# Patient Record
Sex: FEMALE | Race: ASIAN | URBAN - METROPOLITAN AREA
[De-identification: names, ages, dates, MRNs, and addresses within clinical notes are randomized per-mention and may not be internally consistent; named-entity substitution may affect disease eponyms.]

---

## 2017-09-07 ENCOUNTER — OFFICE VISIT (OUTPATIENT)
Dept: FAMILY MEDICINE | Facility: CLINIC | Age: 22
End: 2017-09-07

## 2017-09-07 VITALS
TEMPERATURE: 98.4 F | DIASTOLIC BLOOD PRESSURE: 60 MMHG | WEIGHT: 115 LBS | SYSTOLIC BLOOD PRESSURE: 90 MMHG | OXYGEN SATURATION: 98 % | BODY MASS INDEX: 21.16 KG/M2 | HEART RATE: 71 BPM | HEIGHT: 62 IN | RESPIRATION RATE: 16 BRPM

## 2017-09-07 DIAGNOSIS — R35.89 POLYURIA: ICD-10-CM

## 2017-09-07 DIAGNOSIS — R42 DIZZINESS: Primary | ICD-10-CM

## 2017-09-07 LAB
ALBUMIN SERPL-MCNC: 4.3 G/DL (ref 3.4–5)
ALP SERPL-CCNC: 59 U/L (ref 40–150)
ALT SERPL W P-5'-P-CCNC: 19 U/L (ref 0–50)
ANION GAP SERPL CALCULATED.3IONS-SCNC: 10 MMOL/L (ref 3–14)
AST SERPL W P-5'-P-CCNC: 11 U/L (ref 0–45)
BILIRUB SERPL-MCNC: 0.4 MG/DL (ref 0.2–1.3)
BUN SERPL-MCNC: 7 MG/DL (ref 7–30)
CALCIUM SERPL-MCNC: 9 MG/DL (ref 8.5–10.1)
CHLORIDE SERPL-SCNC: 104 MMOL/L (ref 94–109)
CO2 SERPL-SCNC: 24 MMOL/L (ref 20–32)
CREAT SERPL-MCNC: 0.54 MG/DL (ref 0.52–1.04)
ERYTHROCYTE [DISTWIDTH] IN BLOOD BY AUTOMATED COUNT: 12.5 % (ref 10–15)
GFR SERPL CREATININE-BSD FRML MDRD: >90 ML/MIN/1.7M2
GLUCOSE SERPL-MCNC: 109 MG/DL (ref 70–99)
HBA1C MFR BLD: 6 % (ref 4.3–6)
HCT VFR BLD AUTO: 37.7 % (ref 35–47)
HGB BLD-MCNC: 13 G/DL (ref 11.7–15.7)
MCH RBC QN AUTO: 31.2 PG (ref 26.5–33)
MCHC RBC AUTO-ENTMCNC: 34.5 G/DL (ref 31.5–36.5)
MCV RBC AUTO: 90 FL (ref 78–100)
PLATELET # BLD AUTO: 311 10E9/L (ref 150–450)
POTASSIUM SERPL-SCNC: 3.6 MMOL/L (ref 3.4–5.3)
PROT SERPL-MCNC: 8.6 G/DL (ref 6.8–8.8)
RBC # BLD AUTO: 4.17 10E12/L (ref 3.8–5.2)
SODIUM SERPL-SCNC: 138 MMOL/L (ref 133–144)
TSH SERPL DL<=0.005 MIU/L-ACNC: 2.1 MU/L (ref 0.4–4)
WBC # BLD AUTO: 10.2 10E9/L (ref 4–11)

## 2017-09-07 ASSESSMENT — PATIENT HEALTH QUESTIONNAIRE - PHQ9: SUM OF ALL RESPONSES TO PHQ QUESTIONS 1-9: 15

## 2017-09-07 ASSESSMENT — PAIN SCALES - GENERAL: PAINLEVEL: SEVERE PAIN (7)

## 2017-09-07 NOTE — MR AVS SNAPSHOT
After Visit Summary   9/7/2017    Tanner Bowling    MRN: [id number]           Patient Information     Date Of Birth          1995        Visit Information        Provider Department      9/7/2017 6:30 PM Clinician, [facility]        Today's Diagnoses     Dizziness    -  1    Polyuria          Care Instructions    Patient Visit Summary    Patient Name: Tanner Bowling  MRN: [id number]    Date of Visit: 9/7/2017    Principle Diagnosis: headache and dizziness       Lab Tests Performed:   - Complete metabolic panel   - Thyroid Stimulating Hormone   - Hemoglobin A1C   - Complete blood count     Follow Up/Results:   PN will call with lab results and further instructions.     Referrals and Instructions:   [facility] will be having ophthomology night on 9/18 where they can look at your eyes.     Physician: Dr. Catrina Galvez           Follow-ups after your visit        Who to contact     Please call your clinic at [phone] to:    Ask questions about your health    Make or cancel appointments    Discuss your medicines    Learn about your test results    Speak to your doctor   If you have compliments or concerns about an experience at your clinic, or if you wish to file a complaint, please contact [facility] Patient Relations at [phone] or email us at [email or web address]         Additional Information About Your Visit        MyChart Information     [email or web address] is an electronic gateway that provides easy, online access to your medical records. With [employer], you can request a clinic appointment, read your test results, renew a prescription or communicate with your care team.     To sign up for [email or web address] visit the website at [email or web address]   You will be asked to enter the access code listed below, as well as some personal information. Please follow the directions to create your username and password.     Your access code is:  "[email or web address]  Expires: 2017  8:18 PM     Your access code will  in 90 days. If you need help or a new code, please contact your [facility] or call [phone] for assistance.        Care EveryWhere ID     This is your Care EveryWhere ID. This could be used by other organizations to access your [place] medical records  [id number]        Your Vitals Were     Pulse Temperature Respirations Height Pulse Oximetry BMI (Body Mass Index)    71 98.4  F (36.9  C) (Oral) 16 5' 2\" (157.5 cm) 98% 21.03 kg/m2       Blood Pressure from Last 3 Encounters:   17 90/60    Weight from Last 3 Encounters:   17 115 lb (52.2 kg)              We Performed the Following     CBC with platelets     Comprehensive metabolic panel     Hemoglobin A1c     TSH with free T4 reflex        Primary Care Provider    None Specified       No primary provider on file.        Equal Access to Services     ALE CEE : Hadii kenny rodaso Rebekah, waaxda luqadaha, qaybta kaalmada adearnolda, leonard mobley . So [facility] [phone].    ATENCIÓN: Si habla español, tiene a de disposición servicios gratuitos de asistencia lingüística. Alina al [phone].    We comply with applicable federal civil rights laws and [place] laws. We do not discriminate on the basis of race, color, national origin, age, disability sex, sexual orientation or gender identity.            Thank you!     Thank you for choosing [facility]  for your care. Our goal is always to provide you with excellent care. Hearing back from our patients is one way we can continue to improve our services. Please take a few minutes to complete the written survey that you may receive in the mail after your visit with us. Thank you!             Your Updated Medication List - Protect others around you: Learn how to safely use, store and throw away your medicines at www.disposemymeds.org.      Notice  As of " 9/7/2017  8:18 PM    You have not been prescribed any medications.

## 2017-09-07 NOTE — Clinical Note
I have completed my note, please review, add tie in statement and close encounter.   Thanks!   Margaret Hudson

## 2017-09-08 NOTE — NURSING NOTE
Patient is 21 year old female who presents to clinic with CC of dizziness accompanied with a headache. She reports of a headache with dizziness for 4 days now and blurry vision. Denies aura, stomach pain or night sweats. Also denies vertigo.     Has not tried any pain relief, but experiences relief by laying down in a darker room and with eyes closed.     Patient also describes a PMHx of depression, PTSD and anxiety. Currently taking Sirtalin 100 mg 1x daily. Denies side effects.     Michelle Mckeon, SN

## 2017-09-08 NOTE — PATIENT INSTRUCTIONS
Patient Visit Summary    Patient Name: Tanner Bowling  MRN: [id number]    Date of Visit: 9/7/2017    Principle Diagnosis: headache and dizziness       Lab Tests Performed:   - Complete metabolic panel   - Thyroid Stimulating Hormone   - Hemoglobin A1C   - Complete blood count     Follow Up/Results:   [facility] will call with lab results and further instructions.     Referrals and Instructions:   [facility] will be having ophthomology night on 9/18 where they can look at your eyes.     Physician: Dr. Catrina Galvez

## 2017-09-08 NOTE — PROGRESS NOTES
"MEDICINE NOTE    SUBJECTIVE:   Tanner is a 21 year old female who presents with a multiple month history of worsening headache and dizziness that has been further exacerbated for the last week. She states that the dizziness, which she describes as a heavy-headed feeling that often causes her to lose her balance, has been constant but increasing in severity for a few months now. The headache, which is focal but in inconsistent locations, has progressively worsened over a similar time frame. She rates the latest severity of the headache as a 7/10 and reports no auras. She reports that in the last week, her vision has blurred more than she's used to (she's nearsighted, but it seems that the distance threshold for unclear vision has decreased), and that her hearing seems worse. Upon review of systems, she noted that she has lost about 20 pounds since her last healthcare visit in January. Significantly, she reports frequent urination, stating that if she drinks a full glass of water, she'll \"have to pee three times in ten minutes\" and that her friends have mentioned that she urinates a lot. Finally, she mentioned that her left arm often feels generally numb when she has her headache.     She reports eating well (three meals per day) and drinking adequate water, as well as exercising regularly.    REVIEW OF SYSTEMS:  Gen: generalized weakness, no fevers, chills  Eyes: <see HPI>, nearsightedness, no diplopia  Ears, Noses, Mouth, Throat: see HPI re: hearing change, no ringing in ears, no epistaxis, occasional nasal discharge, frequent dry mouth  Cardiac: no chest pain, or palpitations  Lungs: some coughing in the middle of the night, no dyspnea or shortness of breath, no sputum or hemoptysis  Neck: some neck stiffness  GI: occasional nausea, normally fairly constipated (1 BM every 2-3 days), no vomiting or diarrhea , abdominal pain related to constipation  : frequent urination (see HPI), no hematuria, or sexual " dysfunction  Musculoskeletal: no joint or muscle pain or swelling  Skin: no concerning lesions  Neuro: See HPI, hand tremor sometimes, no seizure or fainting  Endo: See HPI  Heme/Lymph: no bleeding problems  Psych: reported diagnosed depression and anxiety, has been having roommate troubles which have affected her mood negatively, feels tired frequently but regularly gets 7 hrs sleep per night    No past medical history on file.    No past surgical history on file.    No family history on file.    Social History     Social History     Marital status: Single     Spouse name: N/A     Number of children: N/A     Years of education: N/A     Occupational History     Not on file.     Social History Main Topics     Smoking status: Not on file     Smokeless tobacco: Not on file     Alcohol use Not on file     Drug use: Not on file     Sexual activity: Not on file     Other Topics Concern     Not on file     Social History Narrative    Date of Service:2017     Name: Tanner WEN (Month, Day, Year of birth): 1995     MRN: [id number]     New Patient:              (YES / NO Answer required)    Preferred Language: English     Needed:         (YES / NO Answer required)    County of Residence:     Marital Status: Single    Household size:     Number of Dependents:    (Provide number)    Pregnant:             (YES / NO Answer required)    Average Monthly Income: $     Citizenship Status:     Notes on Assistance Programs:        CLINIC REFERRALS:    List Referrals Needed:    Reason for Referral:     Timeline for Follow-Up:     Bus Access Required: (Y/N)    Insurance Status: (no insurance/medicare/[place]/Private(specify))    Appointment Availability: (Mornings/Afternoons/Evenings)    Did the CHW provide: Info sheets? (Y/N)    A Map? (Y/N)    Does the patient understand the referral?         FOLLOW UP:     Did the patient agreed to be contacted?    Primary Phone Number (include area code):     Ok to leave a  "message? (Y/N)    Secondary Phone Number (include area code):     Ok to leave a message? (Y/N)         9/7/17: Pt. Declined CHW services at this time. MI       OBJECTIVE:  Physical Exam:  BP 90/60 (BP Location: Left arm, Patient Position: Sitting, Cuff Size: Adult Regular)  Pulse 71  Temp 98.4  F (36.9  C) (Oral)  Resp 16  Ht 5' 2\" (157.5 cm)  Wt 115 lb (52.2 kg)  SpO2 98%  BMI 21.03 kg/m2  Constitutional: squinting and holding head throughout the exam   Eyes: normal extra-ocular movements, though her eyes frequently squinted and felt uncomfortable to her during the exam, PERRL  Ears, Nose and Throat: no thyromegaly.   Neurological: cranial nerves intact, normal strength and sensation, reflexes at patella, calcaneus tendon, and triceps normal, normal overall gait except for some instability on heel-toe walk, no tremor, normal rapid alternating movement, normal romberg, normal sharp/dull sensation  Psychological: appropriate mood     ASSESSMENT/PLAN:    Tanner was seen today for dizziness and headache. We weren't able to figure out the cause of her problems, but decided to order several studies to investigate further. We ordered a CBC to look into possible anemia or other hematological issues, a CMP to look into electrolyte and metabolic possibilities, an A1C to investigate possibility of diabetes, and a TSH to investigate possible thyroid dysfunction. The med clinician will communicate results this week and discuss further planning once results are available.    Tanner was seen today for dizziness.    Diagnoses and all orders for this visit:    Dizziness  -     Comprehensive metabolic panel  -     CBC with platelets  -     TSH with free T4 reflex    Polyuria  -     Hemoglobin A1c        Med Clinician: Reji Chase, MS2  Preceptor: Catrina Galvez    I am signing this for Catrina Galvez who has been unable to sign this note in a timely manner.  The content of this note has been reviewed by the preceptor for " accuracy.  I did not participate in the care of this patient.  Iban Thompson MD - Medical Director, [facility]

## 2017-09-08 NOTE — PROGRESS NOTES
"[facility] Progress Note    Chief complaint: headache and dizziness on and off for several years accompanied by blurry vision    Subjective:  Pt concerns today    1: headache and dizziness  2: left arm and left hand numbness that accompanies the headaches  3: constipation  4: frequent urination  5: depression (diagnosed in January)  6: post traumatic stress disorder and anxiety  7: near sightedness, astigmatism  8: irregular/missed periods    Patient presents to clinic with the chief complaint of dizziness and headache that has been particularly bothersome the last 4 days. Patient rates the pain a 7 out of 10 on the pain scale, with 10 being the worst pain experienced and 1 being very little pain. No aura occurs with the headache.     Patient states that she \"doesn't like meds\" and that she forgets to take her sertraline at least 2 times per week. Since starting the sertraline, the patient has dropped from 135 pounds to 115 pounds without trying.     Patient requested flu shot, but flu shots were not available. Patient was instructed that next Monday the flu shots would be available. Patient is currently very stressed about living with her current roommate. She also started a new job recently.   Current Outpatient Prescriptions   Medication Sig Dispense Refill     SERTRALINE HCL PO Take 100 mg by mouth every morning           Objective:   BP 90/60 (BP Location: Left arm, Patient Position: Sitting, Cuff Size: Adult Regular)  Pulse 71  Temp 98.4  F (36.9  C) (Oral)  Resp 16  Ht 5' 2\" (157.5 cm)  Wt 115 lb (52.2 kg)  SpO2 98%  BMI 21.03 kg/m2    Assessment:     No drug therapy problems to address at this time. Awaiting lab results before initiating or discontinuing any prescription drug therapies.     Plan:  1. Make the appropriate recommendations once lab results are available. Given that several concerning complaints were made by the patient, it was deemed best to wait before initiating drug therapies. " However, the patient requested something for nausea to hold her over until the lab results became available. The physician on site recommended over-the-counter meclizine, as no anti-emetic medications are in the [facility] formulary.      Pharmacy Follow-Up Plan (Method, Date, Parameters): follow up via phone on 9/11/17 to determine whether the patient is taking the meclizine and if it is giving her relief from the nausea.    PharmCare Clinician: Margaret Hudson  Pharmacy Preceptor: Donald Ernandez    _____________________________  Preceptor Use Only:  In supervising the student, I have reviewed and verified the student's documentation and found it to be correct and complete.   Preceptor Signature: Demetrius Ernandez, Pharm.D.

## 2017-09-08 NOTE — PROGRESS NOTES
Dental CC:   Tanner Bowling, a 21 year old female presents with the following dental concerns: no current concerns    Head and Neck Exam:   Extraoral findings: No extraoral findings.      Intraoral Findings: No intraoral findings    Found no of periodontal disease. Oral Health Assessment and Counseling form indicated increased periodontal disease.    Found no of dental caries. Oral Health Assessment and Counseling form indicated increased caries risk.         Educated on:    Proper oral hygiene: yes   Need for preventative dental services: yes Importance of catching oral disease processes at earliest stages: yes  All questions answered and patient was given list of dental clinics.     Additional notes for follow-up:     Dental exam completed by: Shirin Kirk